# Patient Record
Sex: MALE | Race: WHITE | NOT HISPANIC OR LATINO | Employment: UNEMPLOYED | URBAN - METROPOLITAN AREA
[De-identification: names, ages, dates, MRNs, and addresses within clinical notes are randomized per-mention and may not be internally consistent; named-entity substitution may affect disease eponyms.]

---

## 2018-03-12 ENCOUNTER — HISTORICAL (OUTPATIENT)
Dept: ADMINISTRATIVE | Facility: HOSPITAL | Age: 38
End: 2018-03-12

## 2018-12-04 ENCOUNTER — HISTORICAL (OUTPATIENT)
Dept: INTERNAL MEDICINE | Facility: CLINIC | Age: 38
End: 2018-12-04

## 2018-12-05 ENCOUNTER — HISTORICAL (OUTPATIENT)
Dept: INTERNAL MEDICINE | Facility: CLINIC | Age: 38
End: 2018-12-05

## 2019-08-28 ENCOUNTER — HISTORICAL (OUTPATIENT)
Dept: INTERNAL MEDICINE | Facility: CLINIC | Age: 39
End: 2019-08-28

## 2019-08-30 LAB — FINAL CULTURE: NO GROWTH

## 2019-09-23 ENCOUNTER — HISTORICAL (OUTPATIENT)
Dept: INTERNAL MEDICINE | Facility: CLINIC | Age: 39
End: 2019-09-23

## 2019-10-01 ENCOUNTER — HISTORICAL (OUTPATIENT)
Dept: RADIOLOGY | Facility: HOSPITAL | Age: 39
End: 2019-10-01

## 2019-11-12 ENCOUNTER — HISTORICAL (OUTPATIENT)
Dept: INTERNAL MEDICINE | Facility: CLINIC | Age: 39
End: 2019-11-12

## 2019-11-14 LAB — FINAL CULTURE: NO GROWTH

## 2022-04-09 ENCOUNTER — HISTORICAL (OUTPATIENT)
Dept: ADMINISTRATIVE | Facility: HOSPITAL | Age: 42
End: 2022-04-09

## 2022-04-26 VITALS
HEIGHT: 71 IN | BODY MASS INDEX: 22.04 KG/M2 | WEIGHT: 157.44 LBS | DIASTOLIC BLOOD PRESSURE: 79 MMHG | SYSTOLIC BLOOD PRESSURE: 118 MMHG

## 2023-02-14 DIAGNOSIS — M79.641 RIGHT HAND PAIN: Primary | ICD-10-CM

## 2023-02-20 ENCOUNTER — HOSPITAL ENCOUNTER (OUTPATIENT)
Dept: RADIOLOGY | Facility: HOSPITAL | Age: 43
Discharge: HOME | End: 2023-02-20
Attending: STUDENT IN AN ORGANIZED HEALTH CARE EDUCATION/TRAINING PROGRAM
Payer: MEDICAID

## 2023-02-20 ENCOUNTER — OFFICE VISIT (OUTPATIENT)
Dept: ORTHOPEDICS | Facility: CLINIC | Age: 43
End: 2023-02-20
Payer: MEDICAID

## 2023-02-20 VITALS
SYSTOLIC BLOOD PRESSURE: 106 MMHG | DIASTOLIC BLOOD PRESSURE: 73 MMHG | HEART RATE: 96 BPM | WEIGHT: 153 LBS | HEIGHT: 71 IN | BODY MASS INDEX: 21.42 KG/M2

## 2023-02-20 DIAGNOSIS — M79.641 RIGHT HAND PAIN: ICD-10-CM

## 2023-02-20 PROCEDURE — 3074F SYST BP LT 130 MM HG: CPT | Mod: CPTII,,, | Performed by: STUDENT IN AN ORGANIZED HEALTH CARE EDUCATION/TRAINING PROGRAM

## 2023-02-20 PROCEDURE — 1159F MED LIST DOCD IN RCRD: CPT | Mod: CPTII,,, | Performed by: STUDENT IN AN ORGANIZED HEALTH CARE EDUCATION/TRAINING PROGRAM

## 2023-02-20 PROCEDURE — 3078F PR MOST RECENT DIASTOLIC BLOOD PRESSURE < 80 MM HG: ICD-10-PCS | Mod: CPTII,,, | Performed by: STUDENT IN AN ORGANIZED HEALTH CARE EDUCATION/TRAINING PROGRAM

## 2023-02-20 PROCEDURE — 1159F PR MEDICATION LIST DOCUMENTED IN MEDICAL RECORD: ICD-10-PCS | Mod: CPTII,,, | Performed by: STUDENT IN AN ORGANIZED HEALTH CARE EDUCATION/TRAINING PROGRAM

## 2023-02-20 PROCEDURE — 3008F BODY MASS INDEX DOCD: CPT | Mod: CPTII,,, | Performed by: STUDENT IN AN ORGANIZED HEALTH CARE EDUCATION/TRAINING PROGRAM

## 2023-02-20 PROCEDURE — 99203 OFFICE O/P NEW LOW 30 MIN: CPT | Mod: S$PBB,,, | Performed by: STUDENT IN AN ORGANIZED HEALTH CARE EDUCATION/TRAINING PROGRAM

## 2023-02-20 PROCEDURE — 3074F PR MOST RECENT SYSTOLIC BLOOD PRESSURE < 130 MM HG: ICD-10-PCS | Mod: CPTII,,, | Performed by: STUDENT IN AN ORGANIZED HEALTH CARE EDUCATION/TRAINING PROGRAM

## 2023-02-20 PROCEDURE — 73130 X-RAY EXAM OF HAND: CPT | Mod: TC,RT

## 2023-02-20 PROCEDURE — 99203 PR OFFICE/OUTPT VISIT, NEW, LEVL III, 30-44 MIN: ICD-10-PCS | Mod: S$PBB,,, | Performed by: STUDENT IN AN ORGANIZED HEALTH CARE EDUCATION/TRAINING PROGRAM

## 2023-02-20 PROCEDURE — 3008F PR BODY MASS INDEX (BMI) DOCUMENTED: ICD-10-PCS | Mod: CPTII,,, | Performed by: STUDENT IN AN ORGANIZED HEALTH CARE EDUCATION/TRAINING PROGRAM

## 2023-02-20 PROCEDURE — 99213 OFFICE O/P EST LOW 20 MIN: CPT | Mod: PBBFAC

## 2023-02-20 PROCEDURE — 3078F DIAST BP <80 MM HG: CPT | Mod: CPTII,,, | Performed by: STUDENT IN AN ORGANIZED HEALTH CARE EDUCATION/TRAINING PROGRAM

## 2023-02-20 RX ORDER — BACLOFEN 10 MG/1
10 TABLET ORAL 3 TIMES DAILY
COMMUNITY
Start: 2023-01-09

## 2023-02-20 RX ORDER — GABAPENTIN 300 MG/1
300 CAPSULE ORAL 3 TIMES DAILY
COMMUNITY
Start: 2023-01-09

## 2023-02-20 RX ORDER — TRAMADOL HYDROCHLORIDE 50 MG/1
TABLET ORAL
COMMUNITY
Start: 2023-01-23

## 2023-02-21 NOTE — PROGRESS NOTES
\[facility]\"" Orthopaedic Surgery Clinic Note    In brief, Ferny Alvarado Jr. is a 42 y.o. male, right hand dominant, who presents for evaluation of right hand pain after hitting a glass table approximately 10 day ago. He states he works as a [employer], and he has been wearing a removable splint since his injury. He has been compliant with nonweightbearing. He denies interval injuries, or dislocations. He localizes pain directly over the dorsum of the 4th/5th metacarpal bases of the right hand. He denies paresthesias.       PMH: History reviewed. No pertinent past medical history.    PSH: History reviewed. No pertinent surgical history.    SH:   Social History     Socioeconomic History    Marital status:    Tobacco Use    Smoking status: Every Day     Types: Cigarettes    Smokeless tobacco: Never       FH:   Family History   Family history unknown: Yes       Allergies: Review of patient's allergies indicates:  No Known Allergies    ROS:  See HPI    Physical Exam:  Vitals:    02/20/23 1348   BP: 106/73   Pulse: 96       General: NAD  Cardio: RRR by peripheral pulse  Pulm: Normal WOB on room air, symmetric chest rise    MSK:    RUE  No open wounds  Able to make a fist, no scissoring or obvious deformities  Full flexion and extension of all fingers  Mild swelling over the dorsum of the hand directly over the base of the 4th metacarpal with small bruise  Able to sense light touch in M/U/R   AIN/PIN/U motor intact  Brisk CR    Imaging:   Right hand xrays demonstrate previous amputation through index DIPJ, questionable small base of 4th MC fracture, all metacarpal bases well aligned. No other fractures identified    Assessment:  41yo M with small minimally displaced base of 4th MC fracture, right hand. 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    -recommended discontinuing splint, but remain nonweightbearing for additional 2 weeks, okay for gentle ROM  -return to clinic in 4 weeks or as needed for reevaluation, okay to  cancel if doing well    Elvin Ng MD

## 2023-02-22 NOTE — PROGRESS NOTES
Faculty Attestation: Ferny Cross Jenny Ortega  was seen at [facility] in the Orthopaedic Clinic. Patient seen and evaluated at the time of the visit. History of Present Illness, Physical Exam, and Assessment and Plan reviewed. Treatment plan is reasonable and appropriate. Compliance with treatment recommendations is important. No procedure was performed.     Royer Bergeron MD  Orthopaedic Surgery